# Patient Record
Sex: FEMALE | Race: WHITE | NOT HISPANIC OR LATINO | Employment: UNEMPLOYED | URBAN - METROPOLITAN AREA
[De-identification: names, ages, dates, MRNs, and addresses within clinical notes are randomized per-mention and may not be internally consistent; named-entity substitution may affect disease eponyms.]

---

## 2020-08-06 ENCOUNTER — HOSPITAL ENCOUNTER (OUTPATIENT)
Dept: GENERAL RADIOLOGY | Facility: HOSPITAL | Age: 40
Discharge: HOME | End: 2020-08-06
Admitting: INTERNAL MEDICINE

## 2020-08-06 ENCOUNTER — OFFICE VISIT (OUTPATIENT)
Dept: GASTROENTEROLOGY | Facility: CLINIC | Age: 40
End: 2020-08-06

## 2020-08-06 VITALS — WEIGHT: 196.4 LBS | HEIGHT: 66 IN | TEMPERATURE: 97.8 F | BODY MASS INDEX: 31.57 KG/M2

## 2020-08-06 DIAGNOSIS — E11.649 TYPE 2 DIABETES MELLITUS WITH HYPOGLYCEMIA WITHOUT COMA, WITHOUT LONG-TERM CURRENT USE OF INSULIN (HCC): Primary | ICD-10-CM

## 2020-08-06 DIAGNOSIS — K59.04 CHRONIC IDIOPATHIC CONSTIPATION: ICD-10-CM

## 2020-08-06 PROCEDURE — 74018 RADEX ABDOMEN 1 VIEW: CPT

## 2020-08-06 PROCEDURE — 99203 OFFICE O/P NEW LOW 30 MIN: CPT | Performed by: INTERNAL MEDICINE

## 2020-08-06 NOTE — PROGRESS NOTES
"    PATIENT INFORMATION  Nina Malave       - 1980    CHIEF COMPLAINT  Chief Complaint   Patient presents with   • Abdominal Pain   • Diarrhea       HISTORY OF PRESENT ILLNESS  Here for Diarrhea and a CT was done but not available she does not knkow the findings    No abx and she only goes 2 times a month per her history and then its water. Is diabetic and on her diet she lost >100 pounds    Reviewed Supportive care for constipation and she only mentioned stool softener and fiber as well as suppositories and bentyl  This has only been for the last year!! Her baseline was not much better but 2 times a week.    Family history of colon polyps less than 50 in Mom          REVIEW OF SYSTEMS  Review of Systems   Gastrointestinal: Positive for abdominal pain and diarrhea.   All other systems reviewed and are negative.        ACTIVE PROBLEMS  There are no active problems to display for this patient.        PAST MEDICAL HISTORY  History reviewed. No pertinent past medical history.      SURGICAL HISTORY  History reviewed. No pertinent surgical history.      FAMILY HISTORY  Family History   Problem Relation Age of Onset   • Colon polyps Mother    • Colon polyps Maternal Grandmother    • Colon cancer Neg Hx          SOCIAL HISTORY  Social History     Occupational History   • Not on file   Tobacco Use   • Smoking status: Current Every Day Smoker   • Smokeless tobacco: Never Used   Substance and Sexual Activity   • Alcohol use: Yes   • Drug use: Not on file   • Sexual activity: Not on file         CURRENT MEDICATIONS    Current Outpatient Medications:   •  linaclotide (LINZESS) 290 MCG capsule capsule, Take 1 capsule by mouth Every Morning Before Breakfast., Disp: 30 capsule, Rfl: 11    ALLERGIES  Patient has no known allergies.    VITALS  Vitals:    20 0926   Temp: 97.8 °F (36.6 °C)   TempSrc: Temporal   Weight: 89.1 kg (196 lb 6.4 oz)   Height: 167.6 cm (66\")       LAST RESULTS   No results found for any previous " "visit.     No results found.    PHYSICAL EXAM  Debilities/Disabilities Identified: None  Emotional Behavior: Appropriate  Wt Readings from Last 3 Encounters:   08/06/20 89.1 kg (196 lb 6.4 oz)     Ht Readings from Last 1 Encounters:   08/06/20 167.6 cm (66\")     Body mass index is 31.7 kg/m².  Physical Exam   Constitutional: She is oriented to person, place, and time. She appears well-developed and well-nourished.   HENT:   Head: Normocephalic and atraumatic.   Eyes: Pupils are equal, round, and reactive to light. Conjunctivae and EOM are normal. No scleral icterus.   Neck: Normal range of motion. Neck supple. No thyromegaly present.   Cardiovascular: Normal rate, regular rhythm, normal heart sounds and intact distal pulses. Exam reveals no gallop.   No murmur heard.  Pulmonary/Chest: Effort normal and breath sounds normal. She has no wheezes. She has no rales.   Abdominal: Soft. Bowel sounds are normal. She exhibits no shifting dullness, no distension, no fluid wave, no abdominal bruit, no ascites and no mass. There is no hepatosplenomegaly. There is generalized tenderness. There is no guarding and negative Dumas's sign. Hernia confirmed negative in the ventral area.   Musculoskeletal: Normal range of motion. She exhibits no edema.   Lymphadenopathy:     She has no cervical adenopathy.   Neurological: She is alert and oriented to person, place, and time.   Skin: Skin is warm and dry. No rash noted. She is not diaphoretic. No erythema.   Psychiatric: She has a normal mood and affect. Her behavior is normal.       CLINICAL DATA REVIEWED   reviewed previous lab results and integrated with today's visit, reviewed notes from other physicians and/or last GI encounter, reviewed previous endoscopy results and available photos    ASSESSMENT  Diagnoses and all orders for this visit:    Type 2 diabetes mellitus with hypoglycemia without coma, without long-term current use of insulin (CMS/[facility])    Chronic idiopathic " constipation  -     XR Abdomen KUB; Future    Other orders  -     linaclotide (LINZESS) 290 MCG capsule capsule; Take 1 capsule by mouth Every Morning Before Breakfast.          PLAN  Water fiber exercise and stool softener and Linzess also Sup /enema to help with impactions  Return in about 2 months (around 10/6/2020).    I have discussed the above plan with the patient.  They verbalize understanding and are in agreement with the plan.  They have been advised to contact the office for any questions, concerns, or changes related to their health.

## 2020-10-08 ENCOUNTER — PREP FOR SURGERY (OUTPATIENT)
Dept: OTHER | Facility: HOSPITAL | Age: 40
End: 2020-10-08

## 2020-10-08 ENCOUNTER — OFFICE VISIT (OUTPATIENT)
Dept: GASTROENTEROLOGY | Facility: CLINIC | Age: 40
End: 2020-10-08

## 2020-10-08 VITALS — WEIGHT: 188.2 LBS | BODY MASS INDEX: 30.25 KG/M2 | TEMPERATURE: 98.2 F | HEIGHT: 66 IN

## 2020-10-08 DIAGNOSIS — E11.649 TYPE 2 DIABETES MELLITUS WITH HYPOGLYCEMIA WITHOUT COMA, WITHOUT LONG-TERM CURRENT USE OF INSULIN (HCC): ICD-10-CM

## 2020-10-08 DIAGNOSIS — R10.10 PAIN OF UPPER ABDOMEN: ICD-10-CM

## 2020-10-08 DIAGNOSIS — K21.00 GASTROESOPHAGEAL REFLUX DISEASE WITH ESOPHAGITIS WITHOUT HEMORRHAGE: ICD-10-CM

## 2020-10-08 DIAGNOSIS — R10.9 ABDOMINAL CRAMPING: ICD-10-CM

## 2020-10-08 DIAGNOSIS — K58.2 IRRITABLE BOWEL SYNDROME WITH BOTH CONSTIPATION AND DIARRHEA: ICD-10-CM

## 2020-10-08 DIAGNOSIS — E11.649 TYPE 2 DIABETES MELLITUS WITH HYPOGLYCEMIA WITHOUT COMA, WITHOUT LONG-TERM CURRENT USE OF INSULIN (HCC): Primary | ICD-10-CM

## 2020-10-08 DIAGNOSIS — K21.9 GERD WITHOUT ESOPHAGITIS: ICD-10-CM

## 2020-10-08 DIAGNOSIS — K21.9 GERD WITHOUT ESOPHAGITIS: Primary | ICD-10-CM

## 2020-10-08 DIAGNOSIS — K59.04 CHRONIC IDIOPATHIC CONSTIPATION: ICD-10-CM

## 2020-10-08 PROCEDURE — 99214 OFFICE O/P EST MOD 30 MIN: CPT | Performed by: NURSE PRACTITIONER

## 2020-10-08 RX ORDER — DICYCLOMINE HCL 20 MG
20 TABLET ORAL EVERY 6 HOURS PRN
Qty: 60 TABLET | Refills: 3 | Status: SHIPPED | OUTPATIENT
Start: 2020-10-08 | End: 2021-01-20 | Stop reason: SDUPTHER

## 2020-10-08 RX ORDER — OMEPRAZOLE 40 MG/1
40 CAPSULE, DELAYED RELEASE ORAL DAILY
Qty: 90 CAPSULE | Refills: 3 | Status: SHIPPED | OUTPATIENT
Start: 2020-10-08 | End: 2020-11-06 | Stop reason: SDUPTHER

## 2020-10-08 RX ORDER — SACCHAROMYCES BOULARDII 250 MG
250 CAPSULE ORAL 2 TIMES DAILY
Qty: 180 CAPSULE | Refills: 3 | Status: SHIPPED | OUTPATIENT
Start: 2020-10-08

## 2020-10-08 NOTE — PROGRESS NOTES
PATIENT INFORMATION  Nina Malave       - 1980    CHIEF COMPLAINT  Chief Complaint   Patient presents with   • Follow-up     2 mo follow up on constiaption       HISTORY OF PRESENT ILLNESS  20 scanned labs hgb 13.6 mcv 99, cmp wnl, neg celiac abx panel  nohx EGD/CS    Few years ago was almost 400lbs with typeII DM, has lost almost 200lbs with walking and diet    Pain in her stomach every time after she eats and chronic constipation with cramping using daily linzess 290.     Muliple episodes of diarrhea on current dose.  Relief after diarrhea. Reports off the linzess she was constipated with diarrhea and only every couple of weeks unless she drank tons of coffee to get her to move her bowels.      Sleeps on her belly d/t bloating and abd cramping.     Has tried daily metamucil and is eating a high fiber diet.        REVIEWED PERTINENT RESULTS/ LABS  No results found for: CASEREPORT, FINALDX  No results found for: HGB, MCV, PLT, ALT, AST, HGBA1C, INR, TRIG, FERRITIN, IRON, TIBC   No results found.    REVIEW OF SYSTEMS  Review of Systems   Gastrointestinal: Positive for abdominal pain, constipation and diarrhea.   All other systems reviewed and are negative.        ACTIVE PROBLEMS  There are no active problems to display for this patient.        PAST MEDICAL HISTORY  History reviewed. No pertinent past medical history.      SURGICAL HISTORY  History reviewed. No pertinent surgical history.      FAMILY HISTORY  Family History   Problem Relation Age of Onset   • Colon polyps Mother    • Colon polyps Maternal Grandmother    • Colon cancer Neg Hx          SOCIAL HISTORY  Social History     Occupational History   • Not on file   Tobacco Use   • Smoking status: Current Every Day Smoker   • Smokeless tobacco: Never Used   Substance and Sexual Activity   • Alcohol use: Yes   • Drug use: Not on file   • Sexual activity: Not on file         CURRENT MEDICATIONS    Current Outpatient Medications:   •  dicyclomine  "(Bentyl) 20 MG tablet, Take 1 tablet by mouth Every 6 (Six) Hours As Needed (abd cramping)., Disp: 60 tablet, Rfl: 3  •  linaclotide (LINZESS) 290 MCG capsule capsule, Take 1 capsule by mouth Every Morning Before Breakfast., Disp: 30 capsule, Rfl: 11  •  omeprazole (priLOSEC) 40 MG capsule, Take 1 capsule by mouth Daily., Disp: 90 capsule, Rfl: 3  •  saccharomyces boulardii (Florastor) 250 MG capsule, Take 1 capsule by mouth 2 (Two) Times a Day., Disp: 180 capsule, Rfl: 3    ALLERGIES  Patient has no known allergies.    VITALS  Vitals:    10/08/20 0956   Temp: 98.2 °F (36.8 °C)   TempSrc: Temporal   Weight: 85.4 kg (188 lb 3.2 oz)   Height: 167.6 cm (65.98\")       PHYSICAL EXAM  Debilities/Disabilities Identified: None  Emotional Behavior: Appropriate  Wt Readings from Last 3 Encounters:   10/08/20 85.4 kg (188 lb 3.2 oz)   08/06/20 89.1 kg (196 lb 6.4 oz)     Ht Readings from Last 1 Encounters:   10/08/20 167.6 cm (65.98\")     Body mass index is 30.39 kg/m².  Physical Exam  Vitals signs and nursing note reviewed.   Constitutional:       Appearance: She is well-developed.   HENT:      Head: Normocephalic and atraumatic.   Eyes:      Conjunctiva/sclera: Conjunctivae normal.      Pupils: Pupils are equal, round, and reactive to light.   Neck:      Musculoskeletal: Normal range of motion and neck supple.   Cardiovascular:      Rate and Rhythm: Normal rate and regular rhythm.   Pulmonary:      Effort: Pulmonary effort is normal.      Breath sounds: Normal breath sounds.   Abdominal:      General: Bowel sounds are normal. There is no distension.      Palpations: Abdomen is soft.      Tenderness: There is abdominal tenderness in the right upper quadrant, epigastric area, suprapubic area and left upper quadrant.   Musculoskeletal: Normal range of motion.   Lymphadenopathy:      Cervical: No cervical adenopathy.   Skin:     General: Skin is warm and dry.   Neurological:      Mental Status: She is alert and oriented to " person, place, and time.   Psychiatric:         Behavior: Behavior normal.         CLINICAL DATA REVIEWED   reviewed previous lab results and integrated with today's visit, reviewed notes from other physicians and/or last GI encounter, reviewed previous endoscopy results and available photos, reviewed surgical pathology results from previous biopsies    ASSESSMENT  Diagnoses and all orders for this visit:    Type 2 diabetes mellitus with hypoglycemia without coma, without long-term current use of insulin (CMS/[facility])  -     omeprazole (priLOSEC) 40 MG capsule; Take 1 capsule by mouth Daily.  -     Case Request; Standing  -     Follow Anesthesia Guidelines / Protocol; Future  -     Obtain Informed Consent; Standing  -     Case Request    Chronic idiopathic constipation    Irritable bowel syndrome with both constipation and diarrhea  -     Case Request; Standing  -     Follow Anesthesia Guidelines / Protocol; Future  -     Obtain Informed Consent; Standing  -     Case Request  -     saccharomyces boulardii (Florastor) 250 MG capsule; Take 1 capsule by mouth 2 (Two) Times a Day.  -     dicyclomine (Bentyl) 20 MG tablet; Take 1 tablet by mouth Every 6 (Six) Hours As Needed (abd cramping).    Pain of upper abdomen  -     omeprazole (priLOSEC) 40 MG capsule; Take 1 capsule by mouth Daily.  -     Case Request; Standing  -     Follow Anesthesia Guidelines / Protocol; Future  -     Obtain Informed Consent; Standing  -     Case Request  -     saccharomyces boulardii (Florastor) 250 MG capsule; Take 1 capsule by mouth 2 (Two) Times a Day.    Gastroesophageal reflux disease with esophagitis without hemorrhage  -     omeprazole (priLOSEC) 40 MG capsule; Take 1 capsule by mouth Daily.    GERD without esophagitis  -     Case Request; Standing  -     Follow Anesthesia Guidelines / Protocol; Future  -     Obtain Informed Consent; Standing  -     Case Request  -     saccharomyces boulardii (Florastor) 250 MG capsule; Take 1 capsule by  mouth 2 (Two) Times a Day.    Abdominal cramping  -     dicyclomine (Bentyl) 20 MG tablet; Take 1 tablet by mouth Every 6 (Six) Hours As Needed (abd cramping).          PLAN  Return in about 2 months (around 12/8/2020).       EGD as soon as can fit in.    Linzess samples today lower dose to take daily until fiber kicks in.     Get over the counter simethicone 125mg and you may take 2 twice a day as needed for bloating and gas.     Take a daily probiotic for your gut as well I sent in florastar for you to take 2 pills a day.     Drink lots of water, eat a high fiber diet with 25-30gm a day(check the fiber content in the foods you eat.  ([employer] or [employer] brand protein bars have 15 gm each 2-3 bars ) Protein bars with 15gm of fiber in each bar are a great supplement daily), and get regular exercise.       Don't eat late, don't eat fried and don't eat too much at one time. Avoid tomato based products like pizza, lasagna, spagetti.  If you want to have these take an over the counter Pepcid immediately before you eat them.  Do not eat within 3-4 hrs of bedtime. Limit caffeine and carbonated beverages, if you must have them do not have later in the day.  If reflux is severe elevate the head of the bed. You may also use TUMS, maalox, or mylanta for breakthrough heartburn.              I have discussed the above plan with the patient.  They verbalize understanding and are in agreement with the plan.  They have been advised to contact the office for any questions, concerns, or changes related to their health.

## 2020-10-08 NOTE — PATIENT INSTRUCTIONS
Linzess samples today lower dose to take daily until fiber kicks in.     Get over the counter simethicone 125mg and you may take 2 twice a day as needed for bloating and gas.     Take a daily probiotic for your gut as well I sent in florastar for you to take 2 pills a day.     Drink lots of water, eat a high fiber diet with 25-30gm a day(check the fiber content in the foods you eat.  ([employer] or [employer] brand protein bars have 15 gm each 2-3 bars ) Protein bars with 15gm of fiber in each bar are a great supplement daily), and get regular exercise.       Don't eat late, don't eat fried and don't eat too much at one time. Avoid tomato based products like pizza, lasagna, spagetti.  If you want to have these take an over the counter Pepcid immediately before you eat them.  Do not eat within 3-4 hrs of bedtime. Limit caffeine and carbonated beverages, if you must have them do not have later in the day.  If reflux is severe elevate the head of the bed. You may also use TUMS, maalox, or mylanta for breakthrough heartburn.

## 2020-10-20 ENCOUNTER — TELEPHONE (OUTPATIENT)
Dept: SURGERY | Facility: CLINIC | Age: 40
End: 2020-10-20

## 2020-10-20 NOTE — TELEPHONE ENCOUNTER
PT CALLED TO SAY HER OMEPRAZOLE DOES REQUIRE A PRIOR AUTH, ACCORDING TO HER PHARMACY.  PT # [phone]

## 2020-10-20 NOTE — TELEPHONE ENCOUNTER
Ask the patient if [facility] use the good rx card for this and it should be $13.39 for a 3 month supply

## 2020-11-04 ENCOUNTER — LAB REQUISITION (OUTPATIENT)
Dept: LAB | Facility: HOSPITAL | Age: 40
End: 2020-11-04

## 2020-11-04 DIAGNOSIS — Z00.00 ENCOUNTER FOR GENERAL ADULT MEDICAL EXAMINATION WITHOUT ABNORMAL FINDINGS: ICD-10-CM

## 2020-11-04 PROCEDURE — U0004 COV-19 TEST NON-CDC HGH THRU: HCPCS | Performed by: INTERNAL MEDICINE

## 2020-11-05 LAB — SARS-COV-2 RNA RESP QL NAA+PROBE: NOT DETECTED

## 2020-11-06 ENCOUNTER — LAB REQUISITION (OUTPATIENT)
Dept: LAB | Facility: HOSPITAL | Age: 40
End: 2020-11-06

## 2020-11-06 ENCOUNTER — OUTSIDE FACILITY SERVICE (OUTPATIENT)
Dept: GASTROENTEROLOGY | Facility: CLINIC | Age: 40
End: 2020-11-06

## 2020-11-06 DIAGNOSIS — R10.10 PAIN OF UPPER ABDOMEN: ICD-10-CM

## 2020-11-06 DIAGNOSIS — E11.649 TYPE 2 DIABETES MELLITUS WITH HYPOGLYCEMIA WITHOUT COMA, WITHOUT LONG-TERM CURRENT USE OF INSULIN (HCC): ICD-10-CM

## 2020-11-06 DIAGNOSIS — K21.00 GASTROESOPHAGEAL REFLUX DISEASE WITH ESOPHAGITIS WITHOUT HEMORRHAGE: ICD-10-CM

## 2020-11-06 DIAGNOSIS — E11.649 TYPE 2 DIABETES MELLITUS WITH HYPOGLYCEMIA WITHOUT COMA (HCC): ICD-10-CM

## 2020-11-06 PROCEDURE — 43239 EGD BIOPSY SINGLE/MULTIPLE: CPT | Performed by: INTERNAL MEDICINE

## 2020-11-06 PROCEDURE — 88305 TISSUE EXAM BY PATHOLOGIST: CPT | Performed by: INTERNAL MEDICINE

## 2020-11-06 RX ORDER — OMEPRAZOLE 40 MG/1
40 CAPSULE, DELAYED RELEASE ORAL DAILY
Qty: 90 CAPSULE | Refills: 3 | Status: SHIPPED | OUTPATIENT
Start: 2020-11-06 | End: 2021-01-20 | Stop reason: SDUPTHER

## 2020-11-10 LAB
LAB AP CASE REPORT: NORMAL
LAB AP CLINICAL INFORMATION: NORMAL
LAB AP DIAGNOSIS COMMENT: NORMAL
PATH REPORT.FINAL DX SPEC: NORMAL
PATH REPORT.GROSS SPEC: NORMAL

## 2021-01-20 DIAGNOSIS — R10.9 ABDOMINAL CRAMPING: ICD-10-CM

## 2021-01-20 DIAGNOSIS — K58.2 IRRITABLE BOWEL SYNDROME WITH BOTH CONSTIPATION AND DIARRHEA: ICD-10-CM

## 2021-01-20 DIAGNOSIS — R10.10 PAIN OF UPPER ABDOMEN: ICD-10-CM

## 2021-01-20 DIAGNOSIS — K21.00 GASTROESOPHAGEAL REFLUX DISEASE WITH ESOPHAGITIS WITHOUT HEMORRHAGE: ICD-10-CM

## 2021-01-20 DIAGNOSIS — E11.649 TYPE 2 DIABETES MELLITUS WITH HYPOGLYCEMIA WITHOUT COMA, WITHOUT LONG-TERM CURRENT USE OF INSULIN (HCC): ICD-10-CM

## 2021-01-20 RX ORDER — OMEPRAZOLE 40 MG/1
40 CAPSULE, DELAYED RELEASE ORAL DAILY
Qty: 90 CAPSULE | Refills: 3 | Status: SHIPPED | OUTPATIENT
Start: 2021-01-20

## 2021-01-20 RX ORDER — DICYCLOMINE HCL 20 MG
20 TABLET ORAL EVERY 6 HOURS PRN
Qty: 60 TABLET | Refills: 3 | Status: SHIPPED | OUTPATIENT
Start: 2021-01-20